# Patient Record
Sex: FEMALE | Race: WHITE
[De-identification: names, ages, dates, MRNs, and addresses within clinical notes are randomized per-mention and may not be internally consistent; named-entity substitution may affect disease eponyms.]

---

## 2017-01-12 ENCOUNTER — HOSPITAL ENCOUNTER (OUTPATIENT)
Dept: HOSPITAL 62 - RAD | Age: 62
End: 2017-01-12
Attending: SURGERY
Payer: OTHER GOVERNMENT

## 2017-01-12 DIAGNOSIS — L97.322: Primary | ICD-10-CM

## 2017-05-23 ENCOUNTER — HOSPITAL ENCOUNTER (OUTPATIENT)
Dept: HOSPITAL 62 - SP | Age: 62
End: 2017-05-23
Attending: SURGERY
Payer: OTHER GOVERNMENT

## 2017-05-23 DIAGNOSIS — L97.322: Primary | ICD-10-CM

## 2017-05-23 PROCEDURE — 93970 EXTREMITY STUDY: CPT

## 2017-05-23 PROCEDURE — 93925 LOWER EXTREMITY STUDY: CPT

## 2017-05-24 NOTE — XCELERA REPORT
[address] [address]

                             Tel: [phone]

                             Fax: [phone]



                    Lower Extremity Arterial Evaluation

____________________________________________________________________________



Name: ERASTO ANDERSON

MRN: [id number]                Age: 62 yrs

Gender: Female                 : 1955

Patient Status: Outpatient     Patient Location: 

Account #: [id number]

Study Date: 2017 09:46 AM

Accession #: [id number]

____________________________________________________________________________



Procedure: A color flow and duplex scan of the lower extremity arteries was

performed bilaterally with velocity and waveform anaylsis.

Reason For Study: ULCER





Ordering Physician: WILLIAMS, LENNOX

Performed By: Yasemin Robles

____________________________________________________________________________



____________________________________________________________________________





Measurements and Calculations



                                   Right         Left

  CFA PSV                          108.6         87.3    cm/sec

  Prox PFA PSV                      65.9        -48.5    cm/sec

  Prox SFA PSV                     100.2         86.4    cm/sec

  Mid SFA PSV                      -108.1       -99.7    cm/sec

  Dist SFA PSV                     -104.8       -69.5    cm/sec

  Prox Pop A PSV                    56.6         48.8    cm/sec

  Dist MERON PSV                      51.0         49.8    cm/sec

  Dist PTA PSV                      87.7         74.6    cm/sec

  Scott Pedis PSV                     24.9         54.6    cm/sec



____________________________________________________________________________



Right Side Arterial Evaluation

Normal velocity and biphasic waveforms noted from the Common Femoral artery

to the infrageniculate vessels. Somewhat diminished velocity in the

Dorsalis Pedis.



0-19% stenosis at the Aorta Iliac inflow.



Ankle Brachial index is 1.1.



Left Side Arterial Evaluation

Normal velocity and biphasic waveforms noted from the Common Femoral artery

to the infrageniculate vessels.



0-19% stenosis at the Aorta Iliac inflow.



Ankle Brachial index is 0.9.

____________________________________________________________________________



Interpretation Summary

Mild hemodynamically significant lesions in the bilateral lower

extremities, on duplex imaging, at rest.

____________________________________________________________________________



Electronically signed by:      Lennox Williams      on 2017 07:41 AM



CC: WILLIAMS, LENNOX

>

Williams, Lennox

## 2017-05-24 NOTE — XCELERA REPORT
[address] [address]

                             Tel: [phone]

                             Fax: [phone]



                     Lower Extremity Venous Evaluation

____________________________________________________________________________



Name: ERASTO ANDERSON

MRN: [id number]                Age: 62 yrs

Gender: Female                 : 1955

Patient Status: Outpatient     Patient Location: 

Account #: [id number]

Study Date: 2017 10:12 AM

Accession #: [id number]

____________________________________________________________________________





Reason For Study: ULCER





Ordering Physician: WILLIAMS, LENNOX

Performed By: Yasemin Robles

____________________________________________________________________________



____________________________________________________________________________





Right Sided Venous Evaluation

Deep venous system evaluation shows patent veins with no obstruction or

significant reflux identified.



Sapheno Femoral junction: no reflux.

Femoral vein reflux: no reflux.

Greater Saphenous vein, Proximal thigh: reflux: no reflux.

Greater Saphenous vein, Distal thigh: reflux: no reflux.

Greater Saphenous vein, Proximal below knee: reflux: no reflux.

Small Saphenous vein, : reflux: 3 second reflux. 5 mm.

No significant Perforators identified.



Left Sided Venous Evaluation

Deep venous system evaluation shows patent veins with significant reflux

identified. 1.3 second reflux in the Popliteal vein, 2.5 seconds reflux in

the Femoral vein. There is mural scarring, non occlusive in the Femoral and

Popliteal area, significant for old or prior DVT.



Sapheno Femoral junction: no reflux.

Greater Saphenous vein, Proximal thigh: reflux: no reflux.

Greater Saphenous vein, mid thigh: reflux:no reflux.

Greater Saphenous vein, Distal thigh: reflux:no reflux.

Greater Saphenous vein, Proximal below knee: reflux: none

Greater Saphenous vein, Mid below knee: reflux: none.

Greater Saphenous vein, Distal below knee: reflux: no reflux.

Small Saphenous vein reflux: 3 seconds, 5.7 mm mm diameter.

No significant Perforators identified.

____________________________________________________________________________





Interpretation Summary

No acute DVT. There is old mural scarring in the left Femoral vein with

deep reflux. Bilateral Small Saphenous reflux, as noted.

____________________________________________________________________________



____________________________________________________________________________



Electronically signed by:      Lennox Williams      on 2017 12:42 PM



CC: WILLIAMS, LENNOX

>

Williams, Lennox

## 2017-10-12 ENCOUNTER — HOSPITAL ENCOUNTER (OUTPATIENT)
Dept: HOSPITAL 62 - SP | Age: 62
End: 2017-10-12
Attending: SURGERY
Payer: OTHER GOVERNMENT

## 2017-10-12 DIAGNOSIS — M79.606: Primary | ICD-10-CM

## 2017-10-12 PROCEDURE — 93971 EXTREMITY STUDY: CPT

## 2017-10-12 NOTE — RADIOLOGY REPORT (SQ)
EXAM DESCRIPTION:  VENOUS UNILATERAL LOWER



COMPLETED DATE/TIME:  10/12/2017 11:34 am



REASON FOR STUDY:  LLE PAIN M79.606  PAIN IN LEG, UNSPECIFIED



COMPARISON:  None.



TECHNIQUE:  Dynamic and static gray scale and color images acquired of the left leg venous system. Se
lected spectral images acquired with additional compression and augmentation maneuvers. The contralat
eral common femoral vein and saphenofemoral junction were also imaged. Images stored on PACS.



LIMITATIONS:  None.



FINDINGS:  There is chronic nonocclusive thrombus in common femoral vein through the distal superfici
al femoral vein.  No acute thrombus is seen.  The veins in the calf are unremarkable.  There is no bald[place]
perficial venous thrombosis.



IMPRESSION:  Chronic nonocclusive DVT from the common femoral through the distal superficial femoral 
vein on the left.



TECHNICAL DOCUMENTATION:  JOB ID:  [id number]

 2011 [employer]- All Rights Reserved

## 2019-03-13 ENCOUNTER — HOSPITAL ENCOUNTER (OUTPATIENT)
Dept: HOSPITAL 62 - SP | Age: 64
End: 2019-03-13
Attending: SURGERY
Payer: MEDICARE

## 2019-03-13 DIAGNOSIS — L97.322: ICD-10-CM

## 2019-03-13 DIAGNOSIS — E11.621: Primary | ICD-10-CM

## 2019-03-13 LAB
ADD MANUAL DIFF: NO
ALBUMIN SERPL-MCNC: 4.5 G/DL (ref 3.5–5)
ALP SERPL-CCNC: 88 U/L (ref 38–126)
ALT SERPL-CCNC: 19 U/L (ref 9–52)
ANION GAP SERPL CALC-SCNC: 9 MMOL/L (ref 5–19)
AST SERPL-CCNC: 15 U/L (ref 14–36)
BASOPHILS # BLD AUTO: 0.1 10^3/UL (ref 0–0.2)
BASOPHILS NFR BLD AUTO: 0.7 % (ref 0–2)
BILIRUB DIRECT SERPL-MCNC: 0.3 MG/DL (ref 0–0.4)
BILIRUB SERPL-MCNC: 0.7 MG/DL (ref 0.2–1.3)
BUN SERPL-MCNC: 9 MG/DL (ref 7–20)
CALCIUM: 10.1 MG/DL (ref 8.4–10.2)
CHLORIDE SERPL-SCNC: 105 MMOL/L (ref 98–107)
CO2 SERPL-SCNC: 26 MMOL/L (ref 22–30)
CRP SERPL-MCNC: 11.5 MG/L (ref ?–10)
EOSINOPHIL # BLD AUTO: 0.2 10^3/UL (ref 0–0.6)
EOSINOPHIL NFR BLD AUTO: 1.6 % (ref 0–6)
ERYTHROCYTE [DISTWIDTH] IN BLOOD BY AUTOMATED COUNT: 14.1 % (ref 11.5–14)
ERYTHROCYTE [SEDIMENTATION RATE] IN BLOOD: 25 MM/HR (ref 0–30)
GLUCOSE SERPL-MCNC: 133 MG/DL (ref 75–110)
HCT VFR BLD CALC: 36.7 % (ref 36–47)
HGB BLD-MCNC: 12.5 G/DL (ref 12–15.5)
LYMPHOCYTES # BLD AUTO: 2.3 10^3/UL (ref 0.5–4.7)
LYMPHOCYTES NFR BLD AUTO: 25.4 % (ref 13–45)
MCH RBC QN AUTO: 29.2 PG (ref 27–33.4)
MCHC RBC AUTO-ENTMCNC: 34.2 G/DL (ref 32–36)
MCV RBC AUTO: 86 FL (ref 80–97)
MONOCYTES # BLD AUTO: 0.6 10^3/UL (ref 0.1–1.4)
MONOCYTES NFR BLD AUTO: 6 % (ref 3–13)
NEUTROPHILS # BLD AUTO: 6.1 10^3/UL (ref 1.7–8.2)
NEUTS SEG NFR BLD AUTO: 66.3 % (ref 42–78)
PLATELET # BLD: 318 10^3/UL (ref 150–450)
POTASSIUM SERPL-SCNC: 4 MMOL/L (ref 3.6–5)
PROT SERPL-MCNC: 7.1 G/DL (ref 6.3–8.2)
RBC # BLD AUTO: 4.29 10^6/UL (ref 3.72–5.28)
SODIUM SERPL-SCNC: 140.3 MMOL/L (ref 137–145)
TOTAL CELLS COUNTED % (AUTO): 100 %
WBC # BLD AUTO: 9.2 10^3/UL (ref 4–10.5)

## 2019-03-13 PROCEDURE — 80053 COMPREHEN METABOLIC PANEL: CPT

## 2019-03-13 PROCEDURE — 85652 RBC SED RATE AUTOMATED: CPT

## 2019-03-13 PROCEDURE — 86140 C-REACTIVE PROTEIN: CPT

## 2019-03-13 PROCEDURE — 93925 LOWER EXTREMITY STUDY: CPT

## 2019-03-13 PROCEDURE — 83036 HEMOGLOBIN GLYCOSYLATED A1C: CPT

## 2019-03-13 PROCEDURE — 85025 COMPLETE CBC W/AUTO DIFF WBC: CPT

## 2019-03-13 PROCEDURE — 36415 COLL VENOUS BLD VENIPUNCTURE: CPT

## 2019-03-13 NOTE — RADIOLOGY REPORT (SQ)
EXAM DESCRIPTION:  ANKLE LEFT COMPLETE



COMPLETED DATE/TIME:  3/13/2019 2:11 pm



REASON FOR STUDY:  NON PRESSURE CHRONIC ULCER OF LT ANKLE W/FAT LAYER EXPOSED (L97.322)



COMPARISON:  2017.



NUMBER OF VIEWS:  Three views left ankle.



LIMITATIONS:  None.



FINDINGS:  Osteopenic.  No fracture or bone lesion.  Chronic calcaneal bone spur.

OTHER: No other significant finding.



IMPRESSION:  No acute or suspicious radiographic abnormality.  Findings as above.



TECHNICAL DOCUMENTATION:  JOB ID:  [id number]



Reading location - IP/workstation name: [email or web address]

## 2019-03-14 NOTE — XCELERA REPORT
[address] [address]

                               Tel: [phone]

                               Fax: [phone]



                      Lower Extremity Arterial Evaluation

_______________________________________________________________________________



Name: MONICA ERASTO TONIA

MRN: [id number]                                         Age: 64 yrs

Gender: Female                                          : 1955

Patient Status: Outpatient                              Patient Location: 

Account #: [id number]

Study Date: 2019 02:31 PM

                              Accession #: [id number]

_______________________________________________________________________________

Procedure: A color flow and duplex scan of the lower extremity arteries was

performed bilaterally with velocity and waveform anaylsis.

Reason For Study: ULCER







Ordering Physician: WILLIAMS, LENNOX

Performed By: Leanna Yates

_______________________________________________________________________________

_______________________________________________________________________________





Measurements and Calculations



                                     Right  Left

                     CFA PSV         132.8  69.5 cm/sec

                     Prox PFA PSV    -83.3 -40.7 cm/sec

                     Prox SFA PSV   -107.5  85.1 cm/sec

                     Mid SFA PSV    -115.9  76.8 cm/sec

                     Dist SFA PSV   -134.1 -103.1cm/sec

                     Dist Pop A PSV  69.4   51.9 cm/sec

                     Dist MERON PSV    70.2   47.8 cm/sec

                     Dist PTA PSV    129.5  65.4 cm/sec

                     Scott Pedis PSV   57.3  -38.7 cm/sec



_______________________________________________________________________________

Right Side Arterial Evaluation

Normal velocity and triphasic waveforms noted from the Common Femoral artery

to the Posterior

Tibial . Biphasic with normal velocity, mild spectral broadening, in the

Anterior Tibial and Dorsalis Pedis arteries.

Ankle Brachial index 1.21.



Left Side Arterial Evaluation

Normal velocity and triphasic waveforms noted from the Common Femoral artery

to the Anterior Tibial . Biphasic with low normal velocity in the Deep

Femoral , Posterior Tibial and Dorsalis Pedis arteries.

Ankle Brachial index 0.98.



_______________________________________________________________________________

Interpretation Summary

Mild hemodynamically significant lesions in the bilateral lower extremities,

on duplex imaging, at rest. Isolated atherosclerotic effects, bilaterally.

RALPH's are in normal range suggesting, minimal if any significant arterial

disease.

_______________________________________________________________________________

Electronically signed by:      Lennox Williams      on 2019 09:32 AM



CC: WILLIAMS, LENNOX

>

Williams, Lennox

## 2020-02-19 ENCOUNTER — HOSPITAL ENCOUNTER (OUTPATIENT)
Dept: HOSPITAL 62 - SP | Age: 65
End: 2020-02-19
Attending: NURSE PRACTITIONER
Payer: MEDICARE

## 2020-02-19 DIAGNOSIS — E11.621: ICD-10-CM

## 2020-02-19 DIAGNOSIS — L97.322: Primary | ICD-10-CM

## 2020-02-19 LAB
ADD MANUAL DIFF: NO
ALBUMIN SERPL-MCNC: 4.5 G/DL (ref 3.5–5)
ALP SERPL-CCNC: 77 U/L (ref 38–126)
ANION GAP SERPL CALC-SCNC: 11 MMOL/L (ref 5–19)
AST SERPL-CCNC: 40 U/L (ref 14–36)
BASOPHILS # BLD AUTO: 0.1 10^3/UL (ref 0–0.2)
BASOPHILS NFR BLD AUTO: 0.9 % (ref 0–2)
BILIRUB DIRECT SERPL-MCNC: 0.4 MG/DL (ref 0–0.4)
BILIRUB SERPL-MCNC: 0.7 MG/DL (ref 0.2–1.3)
BUN SERPL-MCNC: 9 MG/DL (ref 7–20)
CALCIUM: 9.7 MG/DL (ref 8.4–10.2)
CHLORIDE SERPL-SCNC: 106 MMOL/L (ref 98–107)
CO2 SERPL-SCNC: 25 MMOL/L (ref 22–30)
CRP SERPL-MCNC: < 5 MG/L (ref ?–10)
EOSINOPHIL # BLD AUTO: 0.1 10^3/UL (ref 0–0.6)
EOSINOPHIL NFR BLD AUTO: 1 % (ref 0–6)
ERYTHROCYTE [DISTWIDTH] IN BLOOD BY AUTOMATED COUNT: 13.6 % (ref 11.5–14)
ERYTHROCYTE [SEDIMENTATION RATE] IN BLOOD: 13 MM/HR (ref 0–30)
GLUCOSE SERPL-MCNC: 102 MG/DL (ref 75–110)
HCT VFR BLD CALC: 37.7 % (ref 36–47)
HGB BLD-MCNC: 12.9 G/DL (ref 12–15.5)
LYMPHOCYTES # BLD AUTO: 1.9 10^3/UL (ref 0.5–4.7)
LYMPHOCYTES NFR BLD AUTO: 22.2 % (ref 13–45)
MCH RBC QN AUTO: 30.3 PG (ref 27–33.4)
MCHC RBC AUTO-ENTMCNC: 34.2 G/DL (ref 32–36)
MCV RBC AUTO: 89 FL (ref 80–97)
MONOCYTES # BLD AUTO: 0.4 10^3/UL (ref 0.1–1.4)
MONOCYTES NFR BLD AUTO: 5.1 % (ref 3–13)
NEUTROPHILS # BLD AUTO: 6.2 10^3/UL (ref 1.7–8.2)
NEUTS SEG NFR BLD AUTO: 70.8 % (ref 42–78)
PLATELET # BLD: 299 10^3/UL (ref 150–450)
POTASSIUM SERPL-SCNC: 4.1 MMOL/L (ref 3.6–5)
PROT SERPL-MCNC: 7.6 G/DL (ref 6.3–8.2)
RBC # BLD AUTO: 4.24 10^6/UL (ref 3.72–5.28)
TOTAL CELLS COUNTED % (AUTO): 100 %
WBC # BLD AUTO: 8.8 10^3/UL (ref 4–10.5)

## 2020-02-19 PROCEDURE — 36415 COLL VENOUS BLD VENIPUNCTURE: CPT

## 2020-02-19 PROCEDURE — 83036 HEMOGLOBIN GLYCOSYLATED A1C: CPT

## 2020-02-19 PROCEDURE — 86140 C-REACTIVE PROTEIN: CPT

## 2020-02-19 PROCEDURE — 93925 LOWER EXTREMITY STUDY: CPT

## 2020-02-19 PROCEDURE — 93922 UPR/L XTREMITY ART 2 LEVELS: CPT

## 2020-02-19 PROCEDURE — 85652 RBC SED RATE AUTOMATED: CPT

## 2020-02-19 PROCEDURE — 80053 COMPREHEN METABOLIC PANEL: CPT

## 2020-02-19 PROCEDURE — 85025 COMPLETE CBC W/AUTO DIFF WBC: CPT

## 2020-02-20 NOTE — XCELERA REPORT
[address] [address]

                               Tel: [phone]

                               Fax: [phone]



                      Lower Extremity Arterial Evaluation

_______________________________________________________________________________



Name: ERASTO ANDERSON I

MRN: [id number]                                         Age: 64 yrs

Gender: Female                                          : 1955

Patient Status: Outpatient                              Patient Location: 

Account #: [id number]

Study Date: 2020 01:17 PM

                              Accession #: [id number]

_______________________________________________________________________________

Procedure: A color flow and duplex scan of the lower extremity arteries was

performed bilaterally with velocity and waveform anaylsis. Ankle brachial

indicies performed.

Reason For Study: LT ANKLE ULCER







Ordering Physician: SANDRA MOSCOSO

Performed By: Froilan Silva

_______________________________________________________________________________

_______________________________________________________________________________





Measurements and Calculations



                                     Right  Left

                     CFA PSV         132.0  61.5 cm/sec

                     Prox PFA PSV   -130.4  40.0 cm/sec

                     Prox SFA PSV    124.3  89.5 cm/sec

                     Mid SFA PSV    -146.1 -101.7cm/sec

                     Dist SFA PSV   -114.7 -53.4 cm/sec

                     Prox Pop A PSV  92.6   60.8 cm/sec

                     Dist Pop A PSV  -92.1 -63.2 cm/sec

                     Dist MERON PSV    63.6   46.5 cm/sec

                     Dist PTA PSV    119.4  63.5 cm/sec

                     Scott Pedis PSV   50.7  -24.8 cm/sec



_______________________________________________________________________________

Right Side Arterial Evaluation

Normal velocity and triphasic waveforms noted from the Common Femoral artery

to the infrageniculate vessels .



Biphasic with low normal velocity in the Dorsalis Pedis .





Ankle Brachial index 1.06.



Left Side Arterial Evaluation

Low normal velocity and biphasic waveforms, moderate spectral broadening,

noted from the Common Femoral artery to the infrageniculate vessels .





Ankle Brachial index 0.81.



_______________________________________________________________________________

Interpretation Summary

Mild hemodynamically significant lesions in the right lower extremity only, on

duplex imaging, at rest. Moderate hemodynamically significant lesions in the

left lower extremity only, on duplex imaging, at rest. Duplex shows no focal

stenosis. Vessels walls look free of atherosclerotic change.

On the right disease noted in the dorsalis Pedis.

On the left significant arterial disease, likely in the Aorta Iliac inflow.



RALPH's are normal on the right, abnormal on the left. Suggesting absence of

significant obstructive disease on the right, moderately severer arterial

obstructive disease on the left. This is concordant with the duplex findings.

_______________________________________________________________________________

Electronically signed by:      Lennox Williams      on 2020 11:48 AM



CC: SANDRA MOSCOSO

>

Williams, Lennox

## 2020-07-21 ENCOUNTER — HOSPITAL ENCOUNTER (OUTPATIENT)
Dept: HOSPITAL 62 - WC | Age: 65
End: 2020-07-21
Attending: NURSE PRACTITIONER
Payer: MEDICARE

## 2020-07-21 DIAGNOSIS — E11.621: Primary | ICD-10-CM

## 2020-07-21 DIAGNOSIS — L97.322: ICD-10-CM

## 2020-07-21 LAB
ADD MANUAL DIFF: NO
ALBUMIN SERPL-MCNC: 4.7 G/DL (ref 3.5–5)
ALP SERPL-CCNC: 54 U/L (ref 38–126)
ANION GAP SERPL CALC-SCNC: 7 MMOL/L (ref 5–19)
AST SERPL-CCNC: 89 U/L (ref 14–36)
BASOPHILS # BLD AUTO: 0 10^3/UL (ref 0–0.2)
BASOPHILS NFR BLD AUTO: 0.4 % (ref 0–2)
BILIRUB DIRECT SERPL-MCNC: 0.1 MG/DL (ref 0–0.4)
BILIRUB SERPL-MCNC: 0.8 MG/DL (ref 0.2–1.3)
BUN SERPL-MCNC: 18 MG/DL (ref 7–20)
CALCIUM: 10.1 MG/DL (ref 8.4–10.2)
CHLORIDE SERPL-SCNC: 103 MMOL/L (ref 98–107)
CO2 SERPL-SCNC: 28 MMOL/L (ref 22–30)
CRP SERPL-MCNC: < 5 MG/L (ref ?–10)
EOSINOPHIL # BLD AUTO: 0.2 10^3/UL (ref 0–0.6)
EOSINOPHIL NFR BLD AUTO: 1.5 % (ref 0–6)
ERYTHROCYTE [DISTWIDTH] IN BLOOD BY AUTOMATED COUNT: 13.9 % (ref 11.5–14)
ERYTHROCYTE [SEDIMENTATION RATE] IN BLOOD: 8 MM/HR (ref 0–30)
GLUCOSE SERPL-MCNC: 97 MG/DL (ref 75–110)
HCT VFR BLD CALC: 37.4 % (ref 36–47)
HGB BLD-MCNC: 12.5 G/DL (ref 12–15.5)
LYMPHOCYTES # BLD AUTO: 2.8 10^3/UL (ref 0.5–4.7)
LYMPHOCYTES NFR BLD AUTO: 27.1 % (ref 13–45)
MCH RBC QN AUTO: 31.6 PG (ref 27–33.4)
MCHC RBC AUTO-ENTMCNC: 33.5 G/DL (ref 32–36)
MCV RBC AUTO: 95 FL (ref 80–97)
MONOCYTES # BLD AUTO: 0.8 10^3/UL (ref 0.1–1.4)
MONOCYTES NFR BLD AUTO: 7.4 % (ref 3–13)
NEUTROPHILS # BLD AUTO: 6.6 10^3/UL (ref 1.7–8.2)
NEUTS SEG NFR BLD AUTO: 63.6 % (ref 42–78)
PLATELET # BLD: 319 10^3/UL (ref 150–450)
POTASSIUM SERPL-SCNC: 5.1 MMOL/L (ref 3.6–5)
PROT SERPL-MCNC: 7.7 G/DL (ref 6.3–8.2)
RBC # BLD AUTO: 3.96 10^6/UL (ref 3.72–5.28)
TOTAL CELLS COUNTED % (AUTO): 100 %
WBC # BLD AUTO: 10.3 10^3/UL (ref 4–10.5)

## 2020-07-21 PROCEDURE — 85025 COMPLETE CBC W/AUTO DIFF WBC: CPT

## 2020-07-21 PROCEDURE — 85652 RBC SED RATE AUTOMATED: CPT

## 2020-07-21 PROCEDURE — 83036 HEMOGLOBIN GLYCOSYLATED A1C: CPT

## 2020-07-21 PROCEDURE — 36415 COLL VENOUS BLD VENIPUNCTURE: CPT

## 2020-07-21 PROCEDURE — 86140 C-REACTIVE PROTEIN: CPT

## 2020-07-21 PROCEDURE — 80053 COMPREHEN METABOLIC PANEL: CPT

## 2020-07-21 NOTE — RADIOLOGY REPORT (SQ)
EXAM DESCRIPTION:  ANKLE LEFT COMPLETE



IMAGES COMPLETED DATE/TIME:  7/21/2020 10:27 am



REASON FOR STUDY:  NON-PRESSURE CHRONIC ULCER OF LEFT ANKLE W FAT LAYER EXPOSED E11.621  TYPE 2 DIABE
RENE MELLITUS WITH FOOT ULCER L97.322  NON-PRESSURE CHRONIC ULCER OF LEFT ANKLE W FAT LAYER



COMPARISON:  None.



NUMBER OF VIEWS:  Three views.



TECHNIQUE:  AP, lateral, and oblique without weight bearing radiographic images acquired of the left 
ankle.



LIMITATIONS:  None.



FINDINGS:  MINERALIZATION: Normal.

BONES: No acute fracture or dislocation. No worrisome bone lesions.  Plantar calcaneal spur.

JOINTS: No effusions.

SOFT TISSUES: No soft tissue swelling. No foreign body.

OTHER: No other significant finding.



IMPRESSION:  NO SIGNIFICANT FINDING IN THE LEFT ANKLE.



TECHNICAL DOCUMENTATION:  JOB ID:  [id number]

 2011 [employer]- All Rights Reserved



Reading location - IP/workstation name: [email or web address]

## 2021-08-01 NOTE — RADIOLOGY REPORT (SQ)
EXAM DESCRIPTION:  ANKLE LEFT COMPLETE



COMPLETED DATE/TIME:  2/19/2020 2:38 pm



REASON FOR STUDY:  NON-PRESSURE CHRONIC ULCER OF LEFT ANKLE W FAT LAYER EXPOSED (L97.322) L97.322  NO
N-PRESSURE CHRONIC ULCER OF LEFT ANKLE W FAT LAYER E11.621  TYPE 2 DIABETES MELLITUS WITH FOOT ULCER



COMPARISON:  None.



NUMBER OF VIEWS:  Three views.



TECHNIQUE:  AP, lateral, and oblique radiographic images acquired of the left ankle.



LIMITATIONS:  None.



FINDINGS:  MINERALIZATION: Normal.

BONES: No acute fracture or dislocation.  No worrisome bone lesions.

JOINTS: No effusions.

SOFT TISSUES: No soft tissue swelling. No foreign body.

OTHER: No other significant finding.



IMPRESSION:  NEGATIVE STUDY OF THE LEFT ANKLE. NO RADIOGRAPHIC EVIDENCE OF ACUTE INJURY.



TECHNICAL DOCUMENTATION:  JOB ID:  [id number]

 2011 [employer]- All Rights Reserved



Reading location - IP/workstation name: [facility]
Never smoker

## 2022-03-29 ENCOUNTER — RX ONLY (RX ONLY)
Age: 67
End: 2022-03-29